# Patient Record
Sex: FEMALE | Race: BLACK OR AFRICAN AMERICAN | Employment: FULL TIME | URBAN - METROPOLITAN AREA
[De-identification: names, ages, dates, MRNs, and addresses within clinical notes are randomized per-mention and may not be internally consistent; named-entity substitution may affect disease eponyms.]

---

## 2018-06-15 ENCOUNTER — HOSPITAL ENCOUNTER (OUTPATIENT)
Dept: PHYSICAL THERAPY | Age: 58
Discharge: HOME | End: 2018-06-15
Payer: COMMERCIAL

## 2018-06-15 PROCEDURE — 97110 THERAPEUTIC EXERCISES: CPT

## 2018-06-15 PROCEDURE — 97162 PT EVAL MOD COMPLEX 30 MIN: CPT

## 2018-06-15 NOTE — PROGRESS NOTES
PHYSICAL THERAPY - DAILY TREATMENT NOTE    Patient Name: Shanelle Bring        Date: 6/15/2018  : 1960   YES Patient  Verified  Visit #:     Insurance: Payor: Fernandez Backer / Plan: [place] [employer] / Product Type: PPO /      In time: 10:10 Out time: 11:07   Total Treatment Time: 53     Medicare Time Tracking (below)   Total Timed Codes (min):  na 1:1 Treatment Time:  na     TREATMENT AREA =  Left knee pain [M25.562]    SUBJECTIVE  Pain Level (on 0 to 10 scale):  3  / 10   Medication Changes/New allergies or changes in medical history, any new surgeries or procedures? NO    If yes, update Summary List   Subjective Functional Status/Changes:  []  No changes reported     See medical history          OBJECTIVE  Modalities Rationale:     decrease edema, decrease pain and increase tissue extensibility to improve patient's ability to stand and walk   min [] Estim, type/location:                                      []  att     []  unatt     []  w/US     []  w/ice    []  w/heat    min []  Mechanical Traction: type/lbs                   []  pro   []  sup   []  int   []  cont    []  before manual    []  after manual    min []  Ultrasound, settings/location:      min []  Iontophoresis w/ dexamethasone, location:                                               []  take home patch       []  in clinic   10 min [x]  Ice     []  Heat    location/position: L knee in supine    min []  Vasopneumatic Device, press/temp:     min []  Other:    [x] Skin assessment post-treatment (if applicable):    [x]  intact    []  redness- no adverse reaction     []redness  adverse reaction:        15 min Therapeutic Exercise:  [x]  See flow sheet   Rationale:      increase ROM and increase strength to improve the patients ability to stand and walk          min Patient Education:  YES  Reviewed HEP   /POC/Goals[x]  Progressed/Changed HEP based on:  Issued HEP. Continue ice 10 minutes at least 1x day.      Other Objective/Functional Measures:    See POC     Post Treatment Pain Level (on 0 to 10) scale:   3  / 10     ASSESSMENT  Assessment/Changes in Function:     Justification for Eval Code Complexity:  Patient History : Chronic pain  Examination see exam   Clinical Presentation: evolving  Clinical Decision Making : FOTO : 30 /100       []  See Progress Note/Recertification   Patient will continue to benefit from skilled PT services to modify and progress therapeutic interventions, address functional mobility deficits, address ROM deficits, address strength deficits, analyze and address soft tissue restrictions, analyze and modify body mechanics/ergonomics and instruct in home and community integration to attain remaining goals. Progress toward goals / Updated goals:    Initial evaluation completed with home exercise program and education initiated.        PLAN  [x]  Upgrade activities as tolerated YES Continue plan of care   []  Discharge due to :    []  Other:      Therapist: Sherial Schirmer, PT    Date: 6/15/2018 Time: 9:27 AM     Future Appointments  Date Time Provider Xiomara Moeller   6/18/2018 11:00 AM Issac Flores [facility]   6/20/2018 10:30 AM Issac Flores PTA [facility]   6/22/2018 2:00 PM Issac Flores PTA [facility]   6/25/2018 12:00 PM [facility] PT [facility]REEZY 1 [facility]   6/27/2018 11:30 AM [facility] PT [facility]REEZY 1 [facility]   6/28/2018 10:30 AM [facility]   6/29/2018 1:00 PM [facility] PT BREEZY 1 [facility]   7/2/2018 11:30 AM Issac Flores PTA [facility]   7/6/2018 11:00 AM Polo Gaston [facility]   7/9/2018 11:30 AM Issac Flores PTA [facility]   7/11/2018 11:30 AM Issac Flores PTA [facility]   7/13/2018 2:30 PM Sherial Schirmer, PT [facility]   7/16/2018 11:30 AM Issac Flores PTA [facility]   7/18/2018 11:30 AM Issac Flores PTA [facility]   7/20/2018 11:30 AM Issac Flores PTA [facility]

## 2018-06-15 NOTE — PROGRESS NOTES
[address]  [facility] PHYSICAL THERAPY AT [facility] [address], [address], [place] [address] - Phone: [phone]  Fax: [phone] / 2469 [facility]  Patient Name: Neelam Johns : 1960   Medical   Diagnosis: Left knee pain [M25.562] Treatment Diagnosis: Left knee pain [M25.562]   Onset Date: 2018 DOS     Referral Source: Zoraida Chang MD Start of [facility]): 6/15/2018   Prior Hospitalization: See medical history Provider #: [id number]   Prior Level of Function: Chronic knee pain since    Comorbidities: HTN   Medications: Verified on Patient Summary List   The Plan of Care and following information is based on the information from the initial evaluation.   ==================================================================================  Assessment / key information:  Patient is a 61 y.o. female who presents to [facility] at [facility] with diagnosis of Left knee pain [M25.562]. Patient is s/p L TKA . Patient reports L knee tightness, stiffness and pain. Patient's pain level ranges from 3/10 to 8/10. Upon objective evaluation patient presents with impaired and painful AROM/AAROM of L knee, impaired strength Lknee and bilateral hip, edema of 6cm at L knee  and decreased flexibility of  quad and hamstring muscles. She is able to generate a minimal L quad set. She requires assist to [facility] for bed mobility. Patient ambulates with rolling walker demonstrating gait deviations of decreased scott and decreased knee flexion during swing phase. AROM/AAROM of L knee as follows:  flexion 40/70 and extension 0. Patient scored 30 on FOTO indicating decreased function and quality of life. Functional limitations include bed mobility, standing, walking, stairs and household chores.   Patient can benefit from skilled PT to increase ROM, flexibility, mobility and strength, decrease pain and edema to improve overall function and quality of life.  ==================================================================================  Eval Complexity: History: MEDIUM  Complexity : 1-2 comorbidities / personal factors will impact the outcome/ POC Exam:HIGH Complexity : 4+ Standardized tests and measures addressing body structure, function, activity limitation and / or participation in recreation  Presentation: MEDIUM Complexity : Evolving with changing characteristics  Clinical Decision Making:MEDIUM Complexity : FOTO score of 26-74Overall Complexity:MEDIUM  Problem List: pain affecting function, decrease ROM, decrease strength, edema affecting function, impaired gait/ balance, decrease ADL/ functional abilitiies, decrease activity tolerance, decrease flexibility/ joint mobility. Treatment Plan may include any combination of the following: Therapeutic exercise, Therapeutic activities, Neuromuscular re-education, Physical agent/modality, Gait/balance training, Manual therapy and Patient education  Patient / Family readiness to learn indicated by: asking questions, trying to perform skills and interest  Persons(s) to be included in education: patient (P)  Barriers to Learning/Limitations: None  Measures taken:    Patient Goal (s): Get leg moving again\"   Patient self reported health status: good  Rehabilitation Potential: good   Short Term Goals: To be accomplished in 3  weeks:  1) Patient performing daily home exercise program.. 2) Increase FOTO to 45 indicating improved function and quality of life. 3) Increase AROM in Lknee 0-90 degrees to facilitate walking. 4) Patient able to perform normal L SLR withquad set to 45 degrees to facilitate independent bed mobility. 5) Patient ambulating household distances with a straight cane.  Long Term Goals: To be accomplished in  6  weeks:  1) Patient  independent with HEP. 2) Patient will improve L knee AROM to 0-120 degrees to facilitate walking.    3) Increase FOTO to 62 indicating improved function and quality of life. 4) Patient will increase L knee strength and bilateral hip abd/flexion to 4/5 so patient able to negotiate 1 flight of stairs with rail and SC step over step. 5) Patient ambulating community distances with a straight cane. Frequency / Duration:   Patient to be seen  3  times per week for 6  weeks:  Patient / Caregiver education and instruction: activity modification, exercises and other ice  G-Codes (GP): kevin    Therapist Signature: Tyler Levy PT Date: 4/78/0640   Certification Period: na Time: 9:30 AM   ==================================================================================  I certify that the above Physical Therapy Services are being furnished while the patient is under my care. I agree with the treatment plan and certify that this therapy is necessary. Physician Signature:        Date:       Time:     Please sign and return to [facility] or you may fax the signed copy to [phone]. Thank you.

## 2018-06-18 ENCOUNTER — HOSPITAL ENCOUNTER (OUTPATIENT)
Dept: PHYSICAL THERAPY | Age: 58
Discharge: HOME | End: 2018-06-18
Payer: COMMERCIAL

## 2018-06-18 ENCOUNTER — APPOINTMENT (OUTPATIENT)
Dept: PHYSICAL THERAPY | Age: 58
End: 2018-06-18
Payer: COMMERCIAL

## 2018-06-18 PROCEDURE — 97140 MANUAL THERAPY 1/> REGIONS: CPT

## 2018-06-18 PROCEDURE — 97110 THERAPEUTIC EXERCISES: CPT

## 2018-06-18 NOTE — PROGRESS NOTES
PHYSICAL THERAPY - DAILY TREATMENT NOTE    Patient Name: Lan Ronquillo        Date: 2018  : 1960   YES Patient  Verified  Visit #:     Insurance: Payor: Jonathan Lu / Plan: [place] [employer] PPO / Product Type: PPO /      In time: 11:05 am Out time: 11:44 am   Total Treatment Time: 39         TREATMENT AREA =  Left knee pain [M25.562]    SUBJECTIVE  Pain Level (on 0 to 10 scale): 3 / 10   Medication Changes/New allergies or changes in medical history, any new surgeries or procedures? NO    If yes, update Summary List   Subjective Functional Status/Changes:  []  No changes reported     \"i woke up feeling sick today. \" doing HEP 2-3x per day. \"It gets so stiff. \" 'I 've been icing on and off through the day. I have a friend that had a knee replacement and I use his using ice machine through night. \" pt reports no checking with MD about ice machine.  friend         OBJECTIVE  Modalities Rationale:     decrease edema, decrease inflammation, decrease pain and increase tissue extensibility to improve patient's ability to perform self care   min [] Estim, type/location:                                      []  att     []  unatt     []  w/US     []  w/ice    []  w/heat    min []  Mechanical Traction: type/lbs                   []  pro   []  sup   []  int   []  cont    []  before manual    []  after manual    min []  Ultrasound, settings/location:      min []  Iontophoresis w/ dexamethasone, location:                                               []  take home patch       []  in clinic   10 min [x]  Ice     []  Heat    location/position: Semi reclined left knee    min []  Vasopneumatic Device, press/temp:     min []  Other:    [x] Skin assessment post-treatment (if applicable):    [x]  intact    []  redness- no adverse reaction     []redness  adverse reaction:        12 min Therapeutic Exercise:  [x]  See flow sheet   Rationale:      increase ROM and increase strength to improve the patients ability to perform self care     9 min Manual Therapy: AAROM for semi reclined hip ABD and SLR. gentlle tapping  Tactile cues for proper quad recruitment, AAROM left knee flexion, gentle PROM Left knee flexion   Rationale:      decrease pain, increase ROM, increase tissue extensibility and decrease edema  to improve patient's ability to improve tissue mobility for self care      8 min Patient Education:  YES  Reviewed HEP   [x]  Progressed/Changed HEP based on:  Good tolerance to exercise. Improving quad set     Other Objective/Functional Measures:    Supine self AAROM left knee flexion: 64 degrees; PROM left knee flexion: 76 degrees     Post Treatment Pain Level (on 0 to 10) scale:   3  / 10     ASSESSMENT  Assessment/Changes in Function:   Progressed exercise per protocol/ POC. Pt requires AAROM for support in supine SLR and to maintain neutral spine posture in semi reclined hip ABD. Pt able to perform SLR with assist and verbal cues. All supine exercise performed this session in semi reclined secondary to pt reporting nausea today. []  See Progress Note/Recertification   Patient will continue to benefit from skilled PT services to modify and progress therapeutic interventions, address functional mobility deficits, address ROM deficits, address strength deficits, analyze and address soft tissue restrictions and instruct in home and community integration to attain remaining goals. Progress toward goals / Updated goals:    First visit from initial HEP.  Progressed treatment per plan of care     PLAN  []  Upgrade activities as tolerated YES Continue plan of care   []  Discharge due to :    []  Other:      Therapist: Perfecto Montiel PTA    Date: 6/18/2018 Time: 11:44 AM     Future Appointments  Date Time Provider Xiomara Moeller   6/20/2018 10:30 AM Perfecto Montiel PTA [facility]   6/22/2018 2:00 PM Perfecto Montiel PTA [facility]   6/25/2018 12:00 PM [facility]   6/27/2018 11:30 AM [facility] PT [facility]   6/28/2018 10:30 AM [facility] AT [facility]   6/29/2018 1:00 PM [facility] 1 [facility]   7/2/2018 11:30 AM Dyan Shows, PTA [facility]   7/6/2018 11:00 AM Leonor Bowser [facility]   7/9/2018 11:30 AM Dyan Shows, PTA [facility]   7/11/2018 11:30 AM Dyan Shows, PTA [facility]   7/13/2018 2:30 PM Casi Santos, PT [facility]   7/16/2018 11:30 AM Dyan Shows, PTA [facility]   7/18/2018 11:30 AM Dyan Shows, PTA [facility]   7/20/2018 11:30 AM Dyan Shows, PTA [facility]

## 2018-06-20 ENCOUNTER — HOSPITAL ENCOUNTER (OUTPATIENT)
Dept: PHYSICAL THERAPY | Age: 58
Discharge: HOME | End: 2018-06-20
Payer: COMMERCIAL

## 2018-06-20 PROCEDURE — 97110 THERAPEUTIC EXERCISES: CPT

## 2018-06-20 PROCEDURE — 97116 GAIT TRAINING THERAPY: CPT

## 2018-06-20 PROCEDURE — 97140 MANUAL THERAPY 1/> REGIONS: CPT

## 2018-06-20 NOTE — PROGRESS NOTES
PHYSICAL THERAPY - DAILY TREATMENT NOTE    Patient Name: Fernandez Roe        Date: 2018  : 1960   YES Patient  Verified  Visit #:   3   of   18  Insurance: Payor: Bakari New / Plan: [place] [employer] PPO / Product Type: PPO /      In time: 10:46 am Out time: 11:26 am   Total Treatment Time: 40         TREATMENT AREA =  Left knee pain [M25.562]    SUBJECTIVE  Pain Level (on 0 to 10 scale): 3  / 10   Medication Changes/New allergies or changes in medical history, any new surgeries or procedures? NO    If yes, update Summary List   Subjective Functional Status/Changes:  []  No changes reported     iTs stiff today. I didn't do much of anything yesterday. I was sick yesterday from the medication.           OBJECTIVE  Modalities Rationale:     decrease edema, decrease inflammation, decrease pain and increase tissue extensibility to improve patient's ability to perform household ambulation   min [] Estim, type/location:                                      []  att     []  unatt     []  w/US     []  w/ice    []  w/heat    min []  Mechanical Traction: type/lbs                   []  pro   []  sup   []  int   []  cont    []  before manual    []  after manual    min []  Ultrasound, settings/location:      min []  Iontophoresis w/ dexamethasone, location:                                               []  take home patch       []  in clinic   10 min [x]  Ice     []  Heat    location/position: Semi reclined LE elevated    min []  Vasopneumatic Device, press/temp:     min []  Other:    [x] Skin assessment post-treatment (if applicable):    [x]  intact    []  redness- no adverse reaction     []redness  adverse reaction:        12 min Therapeutic Exercise:  [x]  See flow sheet   Rationale:      increase ROM and increase strength to improve the patients ability to perform house hold standing and walking     10 min Manual Therapy: Supine AAROM supine knee flexion, PROM left knee flexion , AAROM for supine SLR , SBA for rocking only on recumbent bike   Rationale:      decrease pain, increase ROM and increase tissue extensibility to improve patient's ability to improve tissue mobility in ADLs  8 min gait training in clinic ~ 100 ft x 1 with SBA and SPC. Pt education in eye placement, step length. Equal weight bearing as tolerated in static standing     min Patient Education:  YES  Reviewed HEP   []  Progressed/Changed HEP based on: Other Objective/Functional Measures:  AROM: left knee extension: 0 degrees; AAROM left knee flexion: 58, PROM: 68  Add AAROM on bike rocking only x 3 min, supine to sit for LLE, heel slides and supine SLR,      Post Treatment Pain Level (on 0 to 10) scale:   *3  / 10     ASSESSMENT  Assessment/Changes in Function:   Pt presenting with minimal active quad set recruitment at beginning of treatment. QS improviing to fair-good with small towel under knee and in long sitting for visual and tactile feedback. Min A for LLE for supine to sit on mat improving after exercise and bike to CGA. pt demonstrating improving strength in quad set at end of session. Emphasized HEP 3x per day as tolerated for continued progression toward  ROM goals. pt ambulating into clinic today without AD. Pt reporting she has SPC in car. Recommended SPC for ambulation for community walking for safety in gait. []  See Progress Note/Recertification   Patient will continue to benefit from skilled PT services to modify and progress therapeutic interventions, address functional mobility deficits, address ROM deficits, address strength deficits and instruct in home and community integration to attain remaining goals. Progress toward goals / Updated goals:  1) Patient performing daily home exercise program.goal in progress  2) Increase FOTO to 45 indicating improved function and quality of life.   3) Increase AROM in Lknee 0-90 degrees to facilitate walking.-goal in progress  4) Patient able to perform normal L SLR withquad set to 45 degrees to facilitate independent bed mobility. -goal in progress  5) Patient ambulating household distances with a straight cane-met goal 6-20-18     PLAN  []  Upgrade activities as tolerated YES Continue plan of care   []  Discharge due to :    []  Other:      Therapist: Hitesh Soto PTA    Date: 6/20/2018 Time: 11:26  AM     Future Appointments  Date Time Provider Xiomara Sunni   6/22/2018 2:00 PM Hitesh Soto PTA [place] [address]   6/25/2018 12:00 PM [facility] PT BREEZY 1 [place] [address]   6/27/2018 11:30 AM [facility] PT BREEZY 1 [place] [address]   6/28/2018 10:30 AM Karilyn Soulier [facility] [address]   6/29/2018 1:00 PM [facility] [address] PT BREEZY 1 [place] [address]   7/2/2018 11:30 AM Hitesh Soto PTA [place] [address]   7/6/2018 11:00 AM Juwan Lares [place] [address]   7/9/2018 11:30 AM Hitesh Soto PTA [place] [address]   7/11/2018 11:30 AM Hitesh Soto PTA [place] [address]   7/13/2018 2:30 PM Pauline Johnson PT [place] [address]   7/16/2018 11:30 AM Hitesh Soto PTA [place] [address]   7/18/2018 11:30 AM Hitesh Soto PTA [place] [address]   7/20/2018 11:30 AM Hitesh Soto PTA [place] [address]

## 2018-06-22 ENCOUNTER — HOSPITAL ENCOUNTER (OUTPATIENT)
Dept: PHYSICAL THERAPY | Age: 58
Discharge: HOME | End: 2018-06-22
Payer: COMMERCIAL

## 2018-06-22 PROCEDURE — 97140 MANUAL THERAPY 1/> REGIONS: CPT

## 2018-06-22 PROCEDURE — 97110 THERAPEUTIC EXERCISES: CPT

## 2018-06-22 NOTE — PROGRESS NOTES
PHYSICAL THERAPY - DAILY TREATMENT NOTE    Patient Name: Neelam Johns        Date: 2018  : 1960   YES Patient  Verified  Visit #:     Insurance: Payor: Karolina Martinez / Plan: [place] [employer] PPO / Product Type: PPO /      In time: 2:04 pm Out time: 2:40 pm   Total Treatment Time: 36         TREATMENT AREA =  Left knee pain [M25.562]    SUBJECTIVE  Pain Level (on 0 to 10 scale):  3  / 10   Medication Changes/New allergies or changes in medical history, any new surgeries or procedures? NO    If yes, update Summary List   Subjective Functional Status/Changes:  []  No changes reported     I was OK until last night. Its bothering me mostly where they had the robot go in. ( incision in anterior mid tibia region.)  Pt reports she has been out of her pain medicine x 3 days . Planning to  refill today. Pt reports using SPC in community and going some with AD in home.         OBJECTIVE  Modalities Rationale:     decrease edema, decrease inflammation, decrease pain and increase tissue extensibility to improve patient's ability to perform standing and walking   min [] Estim, type/location:                                      []  att     []  unatt     []  w/US     []  w/ice    []  w/heat    min []  Mechanical Traction: type/lbs                   []  pro   []  sup   []  int   []  cont    []  before manual    []  after manual    min []  Ultrasound, settings/location:      min []  Iontophoresis w/ dexamethasone, location:                                               []  take home patch       []  in clinic   10 min [x]  Ice     []  Heat    location/position: Semi reclined L LE elevated    min []  Vasopneumatic Device, press/temp:     min []  Other:    [x] Skin assessment post-treatment (if applicable):    [x]  intact    []  redness- no adverse reaction     []redness  adverse reaction:        17 min Therapeutic Exercise:  [x]  See flow sheet   Rationale:      increase ROM and increase strength to improve the patients ability to perform functional ADLS     9 min Manual Therapy: Semi reclined gentle PROM into flexion, retrograde massage to L lower compartment to reduce edema   Rationale:      decrease pain, increase ROM and increase tissue extensibility to improve patient's ability to improve tissue mobility in ADLs     min Patient Education:  YES  Reviewed HEP   []  Progressed/Changed HEP based on: Other Objective/Functional Measures:    Self AAROM flexion: 68 degrees, PROM: 75 degrees  Hold rocking on recumbent bike this session . Resume as tolerated. Post Treatment Pain Level (on 0 to 10) scale:   3  / 10     ASSESSMENT  Assessment/Changes in Function:   Pt demonstrating improving quad control and recruitment in supine to sit. Pt decreasing assist from min to CGA after exercise. Review HEP, instruction in self massage techniques. Incision intact without drainage and dressing. MD to remove stitches next week per pt      []  See Progress Note/Recertification   Patient will continue to benefit from skilled PT services to modify and progress therapeutic interventions, address functional mobility deficits, address ROM deficits, address strength deficits and instruct in home and community integration to attain remaining goals. Progress toward goals / Updated goals:  1) Patient performing daily home exercise program.goal in progress  2) Increase FOTO to 45 indicating improved function and quality of life. 3) Increase AROM in Lknee 0-90 degrees to facilitate walking. 4) Patient able to perform normal L SLR withquad set to 45 degrees to facilitate independent bed mobility. 5) Patient ambulating household distances with a straight cane.      PLAN  []  Upgrade activities as tolerated YES Continue plan of care   []  Discharge due to :    []  Other:      Therapist: Chao Johnston PTA    Date: 6/22/2018 Time: 2:40 PM     Future Appointments  Date Time Provider Xiomara Moeller   6/25/2018 12:00 PM [facility] PT [facility] [place] [facility]   6/27/2018 11:30 AM [facility] PT BREEZY 1 [facility]   6/28/2018 10:30 AM [facility]   6/29/2018 1:00 PM [facility] PT BREEZY 1 [facility]   7/2/2018 11:30 AM Lula Ash, PTA [facility]   7/6/2018 11:00 AM Duncan Schaeffer [facility]   7/9/2018 11:30 AM Lula Brine, PTA [facility]   7/11/2018 11:30 AM Lula Brine, PTA [facility]   7/13/2018 2:30 PM Becky Ding, PT [facility]   7/16/2018 11:30 AM Lula Brine, PTA [facility]   7/18/2018 11:30 AM Lula Brine, PTA [facility]   7/20/2018 11:30 AM Lula Brine, PTA [facility]

## 2018-06-25 ENCOUNTER — HOSPITAL ENCOUNTER (OUTPATIENT)
Dept: PHYSICAL THERAPY | Age: 58
Discharge: HOME | End: 2018-06-25
Payer: COMMERCIAL

## 2018-06-25 PROCEDURE — 97140 MANUAL THERAPY 1/> REGIONS: CPT | Performed by: PHYSICAL THERAPIST

## 2018-06-25 PROCEDURE — 97110 THERAPEUTIC EXERCISES: CPT | Performed by: PHYSICAL THERAPIST

## 2018-06-25 NOTE — PROGRESS NOTES
PT DAILY TREATMENT NOTE     Patient Name: Krysten Garcia  Date:2018  : 1960  [x]  Patient  Verified  Payor: Augustin Strauss / Plan: [place] [employer] [place] / Product Type: PPO /    In time:12:00  Out time:1:00  Total Treatment Time (min): 60  Visit #: 5 of 18    Treatment Area: Left knee pain [M25.562]    SUBJECTIVE  Pain Level (0-10 scale): 2  Any medication changes, allergies to medications, adverse drug reactions, diagnosis change, or new procedure performed?: [x] No    [] Yes (see summary sheet for update)  Subjective functional status/changes:   [] No changes reported  Able to get lift leg into bed by herself this morning.       OBJECTIVE    Modality rationale: decrease edema, decrease inflammation and decrease pain to improve the patients ability to walk without pain   Min Type Additional Details    [] Estim:  []Unatt       []IFC  []Premod                        []Other:  [x]w/ice   []w/heat  Position:  Location:    [] Estim: []Att    []TENS instruct  []NMES                    []Other:  []w/US   []w/ice   []w/heat  Position:  Location:    []  Traction: [] Cervical       []Lumbar                       [] Prone          []Supine                       []Intermittent   []Continuous Lbs:  [] before manual  [] after manual    []  Ultrasound: []Continuous   [] Pulsed                           []1MHz   []3MHz W/cm2:  Location:    []  Iontophoresis with dexamethasone         Location: [] Take home patch   [] In clinic   10 [x]  Ice     []  heat  []  Ice massage  []  Laser   []  Anodyne Position:  Supine with HOB elevalted  Location: left knee    []  Laser with stim  []  Other:  Position:  Location:    []  Vasopneumatic Device Pressure:       [] lo [] med [] hi   Temperature: [] lo [] med [] hi   [] Skin assessment post-treatment:  [x]intact []redness- no adverse reaction      38 min Therapeutic Exercise:  [] See flow sheet :   Rationale: increase ROM and increase strength to improve the patients ability to walk without  AD    12 min Manual Therapy:  Gentle patellar mobs and gentle PROM   Rationale: decrease pain, increase ROM, increase tissue extensibility and decrease edema  to walk without             With   [] TE   [] TA   [] neuro   [] other: Patient Education: [x] Review HEP    [] Progressed/Changed HEP based on:   [] positioning   [] body mechanics   [] transfers   [] heat/ice application    [] other:      Other Objective/Functional Measures:     Noted some swelling in left leg - advised elevating foot of mattress to aid in reducing edema  Added bal and bands    AAROM in sittin-75    Pain Level (0-10 scale) post treatment: 1     ASSESSMENT/Changes in Function:  Able to (I) lift leg into bed today. Beginning to walk around the house without cane. Patient will continue to benefit from skilled PT services to modify and progress therapeutic interventions, address functional mobility deficits, address ROM deficits, address strength deficits, analyze and address soft tissue restrictions, analyze and cue movement patterns, assess and modify postural abnormalities and instruct in home and community integration to attain remaining goals. []  See Plan of Care  []  See progress note/recertification  []  See Discharge Summary           Progress toward goals / Updated goals:  1) Patient performing daily home exercise program.Met  2) Increase FOTO to 45 indicating improved function and quality of life. Progressing - reassess at PN  3) Increase AROM in Lknee 0-90 degrees to facilitate walking. Left knee Progressing - AROM:   4) Patient able to perform normal L SLR withquad set to 45 degrees to facilitate independent bed mobility. Met  5) Patient ambulating household distances with a straight cane. Met    PLAN  [x]  Upgrade activities as tolerated     [x]  Continue plan of care  []  Update interventions per flow sheet       []  Discharge due to:_  []  Other:_  Try full revolution on bike nv if able.   Send PN to MD for visit tomorrow.     Estefani Springer, PT 6/25/2018  12:01 PM    Future Appointments  Date Time Provider Xiomara Moeller   6/27/2018 11:30 AM [address] PT BREEZY 1 DMTA [address]   6/29/2018 1:00 PM [address] [facility] [address]   7/2/2018 11:30 AM Christiano Pittman, PTA [place] [id number]   7/6/2018 11:00 AM Christiano Pittman, PTA [place] [address]   7/9/2018 11:30 AM Christiano Pittman, PTA [place] [address]   7/11/2018 11:30 AM [facility]   7/13/2018 2:30 PM Jeannie Philippe, PT [place] [address]   7/16/2018 11:30 AM Christiano Pittman, PTA [place] [address]   7/18/2018 11:30 AM Christiano Pittman, PTA [place] [address]   7/20/2018 11:30 AM Christiano Pittman, PTA [place] [address]

## 2018-06-27 ENCOUNTER — HOSPITAL ENCOUNTER (OUTPATIENT)
Dept: PHYSICAL THERAPY | Age: 58
Discharge: HOME | End: 2018-06-27
Payer: COMMERCIAL

## 2018-06-27 PROCEDURE — 97110 THERAPEUTIC EXERCISES: CPT

## 2018-06-27 PROCEDURE — 97140 MANUAL THERAPY 1/> REGIONS: CPT

## 2018-06-27 PROCEDURE — 97016 VASOPNEUMATIC DEVICE THERAPY: CPT

## 2018-06-27 NOTE — PROGRESS NOTES
PHYSICAL THERAPY - DAILY TREATMENT NOTE    Patient Name: Erika Butler        Date: 2018  : 1960   YES Patient  Verified  Visit #:     Insurance: Payor: Bill Single / Plan: [place] [employer] [place] / Product Type: PPO /      In time: 4:54 Out time: 5:53   Total Treatment Time: 59     Medicare Time Tracking (below)   Total Timed Codes (min):  n/a 1:1 Treatment Time:  n/a     TREATMENT AREA =  Left knee pain [M25.562]    SUBJECTIVE  Pain Level (on 0 to 10 scale):  3  / 10   Medication Changes/New allergies or changes in medical history, any new surgeries or procedures? NO    If yes, update Summary List   Subjective Functional Status/Changes:  []  No changes reported     Pt states \"I had a hard time sleeping last night\"          OBJECTIVE  Modalities Rationale:     decrease inflammation and decrease pain to improve patient's ability to perform ADLs.      min [] Estim, type/location:                                      []  att     []  unatt     []  w/US     []  w/ice    []  w/heat    min []  Mechanical Traction: type/lbs                   []  pro   []  sup   []  int   []  cont    []  before manual    []  after manual    min []  Ultrasound, settings/location:      min []  Iontophoresis w/ dexamethasone, location:                                               []  take home patch       []  in clinic    min []  Ice     []  Heat    location/position:    10 min [x]  Vasopneumatic Device, press/temp: L knee, low pp, 34 deg cleared contraindications    min []  Other:    [x] Skin assessment post-treatment (if applicable):    [x]  intact    []  redness- no adverse reaction     []redness  adverse reaction:        40 min Therapeutic Exercise:  [x]  See flow sheet   Rationale:      increase ROM and increase strength to improve the patients ability to tolerate prolonged standing and walking    9 min Manual Therapy: Gentle gr 1/2 superior and inferior patellar mobs and gentle L knee PROM in flexion and extension Rationale:      decrease pain, increase ROM, increase tissue extensibility and decrease trigger points to improve patient's ability to ambulate with normalized gait. min Patient Education:  YES  Reviewed HEP   []  Progressed/Changed HEP based on: Other Objective/Functional Measures: Added SAQ, TKE, and step up   L knee AROM -9 to 63 deg     Post Treatment Pain Level (on 0 to 10) scale:   0  / 10     ASSESSMENT  Assessment/Changes in Function:     Demonstates decreased L Knee flexion AROM. Progress AAROM with good patient tolerance. Decreased tolerance for L knee flexion PROM. Demonstrated good control with stepping up onto 6\" step, VCing for increased L knee bend in order to ensure proper form. []  See Progress Note/Recertification   Patient will continue to benefit from skilled PT services to modify and progress therapeutic interventions, address functional mobility deficits, address ROM deficits, address strength deficits, analyze and address soft tissue restrictions, analyze and cue movement patterns, analyze and modify body mechanics/ergonomics, assess and modify postural abnormalities, address imbalance/dizziness and instruct in home and community integration to attain remaining goals. Progress toward goals / Updated goals:    1) Patient performing daily home exercise program. Goal in progress   2) Increase FOTO to 45 indicating improved function and quality of life. Goal not assessed  3) Increase AROM in Lknee 0-90 degrees to facilitate walking. Goal in progress - L knee AROM = -9 to 63  4) Patient able to perform normal L SLR with quad set to 45 degrees to facilitate independent bed mobility. 5) Patient ambulating household distances with a straight cane.  Goal Met ambulation community distances with [address], and household distances independently      PLAN  [x]  Upgrade activities as tolerated YES Continue plan of care   []  Discharge due to :    []  Other:      Therapist: Richar Patel Lea Delarosa    Date: 6/27/2018 Time: 11:23 AM     Future Appointments  Date Time Provider Xiomara Sunni   6/27/2018 5:00 PM Bobby Mcdaniels [facility]   6/29/2018 1:00 PM [facility] PT [facility]REE[facility] 1 JASBIRCPTA [facility]   7/2/2018 11:30 AM Delma Shruti, PTA [facility]   7/6/2018 11:00 AM Delma Cooke, PTA [facility]   7/9/2018 11:30 AM Delma Cooke, PTA [facility]   7/11/2018 11:30 AM Delma Shruti, PTA [facility]   7/13/2018 2:30 PM Natalie Etienne, PT [facility]   7/16/2018 11:30 AM Delma Cooke, PTA [facility]   7/18/2018 11:30 AM Delma Cooke, PTA [facility]   7/20/2018 11:30 AM Delma Shruti, PTA [facility]

## 2018-06-28 ENCOUNTER — APPOINTMENT (OUTPATIENT)
Dept: PHYSICAL THERAPY | Age: 58
End: 2018-06-28
Payer: COMMERCIAL

## 2018-06-29 ENCOUNTER — HOSPITAL ENCOUNTER (OUTPATIENT)
Dept: PHYSICAL THERAPY | Age: 58
Discharge: HOME | End: 2018-06-29
Payer: COMMERCIAL

## 2018-06-29 PROCEDURE — 97140 MANUAL THERAPY 1/> REGIONS: CPT | Performed by: PHYSICAL THERAPIST

## 2018-06-29 PROCEDURE — 97112 NEUROMUSCULAR REEDUCATION: CPT | Performed by: PHYSICAL THERAPIST

## 2018-06-29 PROCEDURE — 97110 THERAPEUTIC EXERCISES: CPT | Performed by: PHYSICAL THERAPIST

## 2018-06-29 NOTE — PROGRESS NOTES
PT DAILY TREATMENT NOTE     Patient Name: Alfred Iqbal  Date:2018  : 1960  [x]  Patient  Verified  Payor: Lisa Huertas / Plan: [place] [employer] / Product Type: PPO /    In time:12:56  Out time:2:00  Total Treatment Time (min): 60  Visit #: 7 of 18    Treatment Area: Left knee pain [M25.562]    SUBJECTIVE  Pain Level (0-10 scale): 2  Any medication changes, allergies to medications, adverse drug reactions, diagnosis change, or new procedure performed?: [x] No    [] Yes (see summary sheet for update)  Subjective functional status/changes:   [] No changes reported  Doing well with walking at home with no cane.       OBJECTIVE    Modality rationale: decrease edema, decrease inflammation and decrease pain to improve the patients ability to walk with no pain   Min Type Additional Details    [] Estim:  []Unatt       []IFC  []Premod                        []Other:  []w/ice   []w/heat  Position:  Location:    [] Estim: []Att    []TENS instruct  []NMES                    []Other:  []w/US   []w/ice   []w/heat  Position:  Location:    []  Traction: [] Cervical       []Lumbar                       [] Prone          []Supine                       []Intermittent   []Continuous Lbs:  [] before manual  [] after manual    []  Ultrasound: []Continuous   [] Pulsed                           []1MHz   []3MHz W/cm2:  Location:    []  Iontophoresis with dexamethasone         Location: [] Take home patch   [] In clinic    []  Ice     []  heat  []  Ice massage  []  Laser   []  Anodyne Position:  Location:    []  Laser with stim  []  Other:  Position:  Location:   10 [x]  Vasopneumatic Device Pressure:       [] lo [x] med [] hi   Temperature: [] lo [x] med [] hi   [] Skin assessment post-treatment:  [x]intact []redness- no adverse reaction    []redness  adverse reaction:       30 min Therapeutic Exercise:  [] See flow sheet :   Rationale: increase ROM and increase strength to improve the patients ability to walk without AD      12 min Neuromuscular Re-education:  []  See flow sheet :   Rationale: increase strength, improve coordination, improve balance and increase proprioception  to improve  the patients ability to walk without AD    8 min Manual Therapy:  Gentle AP mobs with IR in sitting with patient working on knee flexion   Rationale: decrease pain, increase ROM and increase tissue extensibility to bend knee for stairs and walking          With   [] TE   [] TA   [] neuro   [] other: Patient Education: [x] Review HEP    [] Progressed/Changed HEP based on:   [] positioning   [] body mechanics   [] transfers   [] heat/ice application    [] other:      Other Objective/Functional Measures:     3 to 88 - AAROM in sitting    Worked on WS and \"hula hoops\" in standing with mirror to increase WB on left leg  Instructed in proper gait with SPC    Pain Level (0-10 scale) post treatment: 1    ASSESSMENT/Changes in Function:  Improved gait with increased WS and WB after session today; AAROM 3-88 degrees. Patient will continue to benefit from skilled PT services to modify and progress therapeutic interventions, address functional mobility deficits, address ROM deficits, address strength deficits, analyze and address soft tissue restrictions, analyze and cue movement patterns, assess and modify postural abnormalities and instruct in home and community integration to attain remaining goals. [x]  See Plan of Care  []  See progress note/recertification  []  See Discharge Summary         Progress toward goals / Updated goals:     1) Patient performing daily home exercise program. Goal in progress   2) Increase FOTO to 45 indicating improved function and quality of life. Goal not assessed  3) Increase AROM in Lknee 0-90 degrees to facilitate walking. Goal in progress - L knee AROM = -3 to 88  4) Patient able to perform normal L SLR with quad set to 45 degrees to facilitate independent bed mobility.  Goal in progress - able to (I) lift left leg with SLR 3x today (6/29/18)  5) Patient ambulating household distances with a straight cane. Goal Met ambulation community distances with [facility], and household distances independently      PLAN  [x]  Upgrade activities as tolerated     [x]  Continue plan of care  []  Update interventions per flow sheet       []  Discharge due to:_  []  Other:_  Progress with WB exercises    Carie Bah, PT 6/29/2018  1:26 PM    Future Appointments  Date Time Provider Xiomara Moeller   7/2/2018 11:30 AM [facility], [facility]   7/6/2018 11:00 AM [facility], [facility]   7/9/2018 11:30 AM [facility], [facility]   7/11/2018 11:30 AM [facility], [facility]   7/13/2018 2:30 PM Grady Martínez, PT [facility]   7/16/2018 11:30 AM [facility], [facility]   7/18/2018 11:30 AM [facility], [facility]   7/20/2018 11:30 AM [facility], [facility]

## 2018-07-02 ENCOUNTER — HOSPITAL ENCOUNTER (OUTPATIENT)
Dept: PHYSICAL THERAPY | Age: 58
Discharge: HOME | End: 2018-07-02
Payer: COMMERCIAL

## 2018-07-02 PROCEDURE — 97140 MANUAL THERAPY 1/> REGIONS: CPT

## 2018-07-02 PROCEDURE — 97110 THERAPEUTIC EXERCISES: CPT

## 2018-07-02 PROCEDURE — 97016 VASOPNEUMATIC DEVICE THERAPY: CPT

## 2018-07-02 PROCEDURE — 97116 GAIT TRAINING THERAPY: CPT

## 2018-07-02 NOTE — PROGRESS NOTES
PHYSICAL THERAPY - DAILY TREATMENT NOTE    Patient Name: Gregg Quinones        Date: 2018  : 1960   YES Patient  Verified  Visit #:     Insurance: Payor: Kate Waldrop / Plan: [place] [employer] [place] / Product Type: PPO /      In time: 11:27 am Out time: 12:15 pm   Total Treatment Time: 48     TREATMENT AREA =  Left knee pain [M25.562]    SUBJECTIVE  Pain Level (on 0 to 10 scale): 2  / 10   Medication Changes/New allergies or changes in medical history, any new surgeries or procedures? NO    If yes, update Summary List   Subjective Functional Status/Changes:  []  No changes reported     Yesterday felt pretty good but today she woke up feeling pretty stiff. Pt reports doing 1 set of HEP prior to PT apt today.            OBJECTIVE  Modalities Rationale:     decrease edema, decrease inflammation, decrease pain and increase tissue extensibility to improve patient's ability to perform prolonged walking and standing   min [] Estim, type/location:                                      []  att     []  unatt     []  w/US     []  w/ice    []  w/heat    min []  Mechanical Traction: type/lbs                   []  pro   []  sup   []  int   []  cont    []  before manual    []  after manual    min []  Ultrasound, settings/location:      min []  Iontophoresis w/ dexamethasone, location:                                               []  take home patch       []  in clinic    min []  Ice     []  Heat    location/position:    10 min [x]  Vasopneumatic Device, press/temp: Low 34 degrees    min []  Other:    [x] Skin assessment post-treatment (if applicable):    [x]  intact    []  redness- no adverse reaction     []redness  adverse reaction:        22 min Therapeutic Exercise:  [x]  See flow sheet   Rationale:      increase ROM and increase strength to improve the patients ability toperform prolonged walking and standing      8 min Manual Therapy: Supine patella glides gr 1-2, PROM left knee flexion and extension, retrograde massage to left lower compartment   Rationale:      decrease pain, increase ROM, increase tissue extensibility and decrease edema  to improve patient's ability to perform prolonged walking and standing  8 min Gait training in clinic even surfaces ~ 300 ft x 1 with SPC and WBAT on LLE. VCs for proper heel strike, step length     min Patient Education:  YES  Reviewed HEP   []  Progressed/Changed HEP based on: Other Objective/Functional Measures:  AROM: -4 to 85 degrees   PROM: 0 to 90 degrees     Post Treatment Pain Level (on 0 to 10) scale:  0  / 10     ASSESSMENT  Assessment/Changes in Function:   Quad sets, heel slides , and SLR, hip ABD performed in supine vs semi reclined. Pt was able to perform supine SLR with fair/good quad control with Verbal and tactile cues for proper  form. [x]  See Progress Note/Recertification   Patient will continue to benefit from skilled PT services to modify and progress therapeutic interventions, address functional mobility deficits, address ROM deficits, address strength deficits, analyze and address soft tissue restrictions and instruct in home and community integration to attain remaining goals. Progress toward goals / Updated goals:  1) Patient performing daily home exercise program. Goal in progress   2) Increase FOTO to 45 indicating improved function and quality of life.  Goal not assessed  3) Increase AROM in Lknee 0-90 degrees to facilitate walking.  Goal in progress - L knee AROM = -3 to 85  4) Patient able to perform normal L SLR with quad set to 45 degrees to facilitate independent bed mobility.  Goal in progress - able to (I) lift left leg with SLR 10x   5) Patient ambulating household distances with a straight cane. Goal Met ambulation community distances with [address], and household distances independently       PLAN  []  Upgrade activities as tolerated YES Continue plan of care   []  Discharge due to :    []  Other:      Therapist: Virgie Singh, PTA    Date: 7/2/2018 Time: 12:15 pm     Future Appointments  Date Time Provider Xiomara Moeller   7/6/2018 11:00 AM Virgie Singh PTA [facility]   7/9/2018 11:30 AM Virgie Singh PTA [facility]   7/11/2018 11:30 AM Virgie Singh PTA [facility]   7/13/2018 2:30 PM Maty Moralez PT [facility]   7/16/2018 11:30 AM Virgie Singh PTA [facility]   7/18/2018 11:30 AM Virgie Singh PTA [facility]   7/20/2018 11:30 AM Virgie Singh PTA [facility]

## 2018-07-06 ENCOUNTER — HOSPITAL ENCOUNTER (OUTPATIENT)
Dept: PHYSICAL THERAPY | Age: 58
Discharge: HOME | End: 2018-07-06
Payer: COMMERCIAL

## 2018-07-06 PROCEDURE — 97016 VASOPNEUMATIC DEVICE THERAPY: CPT

## 2018-07-06 PROCEDURE — 97110 THERAPEUTIC EXERCISES: CPT

## 2018-07-06 PROCEDURE — 97140 MANUAL THERAPY 1/> REGIONS: CPT

## 2018-07-06 NOTE — PROGRESS NOTES
PHYSICAL THERAPY - DAILY TREATMENT NOTE    Patient Name: Lizzette Carranza        Date: 2018  : 1960   YES Patient  Verified  Visit #:     Insurance: Payor: Kenyatta Root / Plan: Jerome Maier PPO / Product Type: PPO /      In time: 11:05 am Out time: 12:02 pm   Total Treatment Time: 57     TREATMENT AREA =  Left knee pain [M25.562]    SUBJECTIVE  Pain Level (on 0 to 10 scale): 0  / 10   Medication Changes/New allergies or changes in medical history, any new surgeries or procedures? NO    If yes, update Summary List   Subjective Functional Status/Changes:  []  No changes reported     Its stiff today. Doing HEP usually 2x per day. Pt reports she was able to put foot up at side of tub this morning to dry feet for the first time.          OBJECTIVE  Modalities Rationale:     decrease edema, decrease inflammation, decrease pain and increase tissue extensibility to improve patient's ability to perform functional ADLs   min [] Estim, type/location:                                      []  att     []  unatt     []  w/US     []  w/ice    []  w/heat    min []  Mechanical Traction: type/lbs                   []  pro   []  sup   []  int   []  cont    []  before manual    []  after manual    min []  Ultrasound, settings/location:      min []  Iontophoresis w/ dexamethasone, location:                                               []  take home patch       []  in clinic    min []  Ice     []  Heat    location/position:    10 min [x]  Vasopneumatic Device, press/temp: Low 34 degrees    min []  Other:    [x] Skin assessment post-treatment (if applicable):    [x]  intact    []  redness- no adverse reaction     []redness  adverse reaction:        39 min Therapeutic Exercise:  [x]  See flow sheet   Rationale:      increase ROM and increase strength to improve the patients ability to perform prolonged walking and standing     8 min Manual Therapy: Supine patella glides; PROM supine knee flexion; scar massage around incision   Rationale:      decrease pain, increase ROM, increase tissue extensibility and decrease edema  to improve patient's ability to improve tissue mobilty     min Patient Education:  YES  Reviewed HEP   []  Progressed/Changed HEP based on: Other Objective/Functional Measures:  PROM: 95 degrees  AROM: 90 degrees     Post Treatment Pain Level (on 0 to 10) scale:  0 / 10     ASSESSMENT  Assessment/Changes in Function:   Emphasized self stretching and review HEP to be performed 2-3x per day as tolerated. Pt challenged with 6 inch step up and quad control in stepping up. Changed to 4 inch step with improved quad control. Pt now able to perform supine SLR independently 1set 10x ; 1 set 5 x; pt instructed in sitting SLR at side of bed 3 reps. Pt able to perform supine hip ABD I x 15 reps. []  See Progress Note/Recertification   Patient will continue to benefit from skilled PT services to modify and progress therapeutic interventions, address functional mobility deficits, address ROM deficits, address strength deficits and instruct in home and community integration to attain remaining goals. Progress toward goals / Updated goals:  1) Patient performing daily home exercise program. Goal in progress  2) Increase FOTO to 45 indicating improved function and quality of life. 3) Increase AROM in Lknee 0-90 degrees to facilitate walking.-Goal in progress  4) Patient able to perform normal L SLR withquad set to 45 degrees to facilitate independent bed mobility. -goal in prgress  5) Patient ambulating household distances with a straight cane.  Met goal 7-6-18     PLAN  []  Upgrade activities as tolerated YES Continue plan of care   []  Discharge due to :    []  Other:      Therapist: Charly Hughes PTA    Date: 7/6/2018 Time: 12:02 pm     Future Appointments  Date Time Provider Xiomara Moeller   7/9/2018 11:30 AM Charly Hughes PTA [facility]   7/11/2018 11:30 AM Charly Hughes PTA [facility]   7/13/2018 2:30 PM Hollywoodc[place]ina Gerber, PT [facility]   7/16/2018 11:30 AM Dagmar Bowen PTA [facility]   7/18/2018 11:30 AM Dagmar Bowen PTA [facility]   7/20/2018 11:30 AM Dagmar Bowen PTA [facility]

## 2018-07-09 ENCOUNTER — HOSPITAL ENCOUNTER (OUTPATIENT)
Dept: PHYSICAL THERAPY | Age: 58
Discharge: HOME | End: 2018-07-09
Payer: COMMERCIAL

## 2018-07-09 PROCEDURE — 97140 MANUAL THERAPY 1/> REGIONS: CPT

## 2018-07-09 PROCEDURE — 97016 VASOPNEUMATIC DEVICE THERAPY: CPT

## 2018-07-09 PROCEDURE — 97110 THERAPEUTIC EXERCISES: CPT

## 2018-07-09 PROCEDURE — 97116 GAIT TRAINING THERAPY: CPT

## 2018-07-09 NOTE — PROGRESS NOTES
PHYSICAL THERAPY - DAILY TREATMENT NOTE    Patient Name: Ed Velasco        Date: 2018  : 1960   YES Patient  Verified  Visit #:   10  of   18  Insurance: Payor: Buddy Valles / Plan: Ceci Medina PPO / Product Type: PPO /      In time: 11:37 am Out time: 12:39 pm   Total Treatment Time: 62     TREATMENT AREA =  Left knee pain [M25.562]    SUBJECTIVE  Pain Level (on 0 to 10 scale):  2  / 10   Medication Changes/New allergies or changes in medical history, any new surgeries or procedures? NO    If yes, update Summary List   Subjective Functional Status/Changes:  []  No changes reported   \"I just woke up with it really stiff today. \"  \"I'm up and moving . I'm standing longer now. Night time is the worst. Last night I couldn't sleep to save my life. That achy pain just wouldn't go away like it usually does. Its better than it was before.      OBJECTIVE  Modalities Rationale:     decrease edema, decrease inflammation, decrease pain and increase tissue extensibility to improve patient's ability to perform functional ADLs   min [] Estim, type/location:                                      []  att     []  unatt     []  w/US     []  w/ice    []  w/heat    min []  Mechanical Traction: type/lbs                   []  pro   []  sup   []  int   []  cont    []  before manual    []  after manual    min []  Ultrasound, settings/location:      min []  Iontophoresis w/ dexamethasone, location:                                               []  take home patch       []  in clinic    min []  Ice     []  Heat    location/position:    10 min [x]  Vasopneumatic Device, press/temp: Low 34 degrees    min []  Other:    [x] Skin assessment post-treatment (if applicable):    [x]  intact    []  redness- no adverse reaction     []redness  adverse reaction:        33 min Therapeutic Exercise:  [x]  See flow sheet   Rationale:      increase ROM, increase strength and improve coordination to improve the patients ability to perform functional ADLs    9 min Manual Therapy: Supine patella glides ; PROM supine knee flexion, retrograde massage to left lower compartement   Rationale:      decrease pain, increase ROM, increase tissue extensibility, decrease edema  and correct positional vertigo to improve patient's ability to improve tissue mobility in functional ADLs   10  Min Gait training: gait training in clinic without AD with mirror for visual feedback and VCs for heel to toe , equal stride length, reciprocal UE arm swing, minimize trendelenburg gait; gait training on stairs for reciprocal gait ascending stairs and single step gait descending stairs   min Patient Education:  YES  Reviewed HEP   []  Progressed/Changed HEP based on: Other Objective/Functional Measures:    AROM: 92 degrees   PROM: 100 degrees   Post Treatment Pain Level (on 0 to 10) scale:   0  / 10     ASSESSMENT  Assessment/Changes in Function:   Pt challenged in side lying hip ABD. MMT left hip ABD: 3-/5. Pt demonstrating improving quad control in supine and side lying SLR. Pt demonstrating improving gait pattern after visual and verbal feed back. Updated written HEP. []  See Progress Note/Recertification   Patient will continue to benefit from skilled PT services to modify and progress therapeutic interventions, address functional mobility deficits, address ROM deficits, address strength deficits and instruct in home and community integration to attain remaining goals.    Progress toward goals / Updated goals:    Reassess for progress note next visit     PLAN  []  Upgrade activities as tolerated YES Continue plan of care   []  Discharge due to :    []  Other:      Therapist: Rachel Asencio PTA    Date: 7/9/2018 Time: 12:39  pm     Future Appointments  Date Time Provider Xiomara Moeller   7/11/2018 11:30 AM Rachel Asencio PTA [facility]   7/13/2018 2:30 PM Ivan Boo, PT [facility]   7/16/2018 11:30 AM Rachel Asencio PTA [facility]   7/18/2018 11:30 AM Danielle Vidales PTA [facility]   7/20/2018 11:30 AM Danielle Vidales PTA [facility]

## 2018-07-11 ENCOUNTER — HOSPITAL ENCOUNTER (OUTPATIENT)
Dept: PHYSICAL THERAPY | Age: 58
Discharge: HOME | End: 2018-07-11
Payer: COMMERCIAL

## 2018-07-11 PROCEDURE — 97110 THERAPEUTIC EXERCISES: CPT

## 2018-07-11 PROCEDURE — 97116 GAIT TRAINING THERAPY: CPT

## 2018-07-11 PROCEDURE — 97140 MANUAL THERAPY 1/> REGIONS: CPT

## 2018-07-11 PROCEDURE — 97016 VASOPNEUMATIC DEVICE THERAPY: CPT

## 2018-07-11 NOTE — PROGRESS NOTES
PHYSICAL THERAPY - DAILY TREATMENT NOTE    Patient Name: Cheri Manzanares        Date: 2018  : 1960   YES Patient  Verified  Visit #:     Insurance: Payor: Gabo Sanchez / Plan: Peyton Urbina PPO / Product Type: PPO /      In time: 11:30 am Out time: 12:43 pm   Total Treatment Time: 73     Medicare Time Tracking (below)   Total Timed Codes (min):  n/a 1:1 Treatment Time:  n/a     TREATMENT AREA =  Left knee pain [M25.562]    SUBJECTIVE  Pain Level (on 0 to 10 scale): 0-1  / 10   Medication Changes/New allergies or changes in medical history, any new surgeries or procedures? NO    If yes, update Summary List   Subjective Functional Status/Changes:  []  No changes reported     Pt reports she is not using anything to walk with in the house.  SPC for community distances         OBJECTIVE  Modalities Rationale:     decrease edema, decrease inflammation, decrease pain and increase tissue extensibility to improve patient's ability to perform stairs,    min [] Estim, type/location:                                      []  att     []  unatt     []  w/US     []  w/ice    []  w/heat    min []  Mechanical Traction: type/lbs                   []  pro   []  sup   []  int   []  cont    []  before manual    []  after manual    min []  Ultrasound, settings/location:      min []  Iontophoresis w/ dexamethasone, location:                                               []  take home patch       []  in clinic    min []  Ice     []  Heat    location/position:    10 min [x]  Vasopneumatic Device, press/temp: Low 34 degrees    min []  Other:    [x] Skin assessment post-treatment (if applicable):    [x]  intact    []  redness- no adverse reaction     []redness  adverse reaction:        47 min Therapeutic Exercise:  [x]  See flow sheet   Rationale:      increase ROM, increase strength, improve balance and increase proprioception to improve the patients ability to perform community walking     8 min Manual Therapy: Supine patella glides gr 1-2; PROM supine knee flexion   Rationale:      decrease pain, increase ROM and increase tissue extensibility to improve patient's ability to increase tissue mobility for full ROM  8 min Gait training : gait training in clinic without AD with mirror for visual feedback and VCs for heel to toe , equal stride length, reciprocal UE arm swing, minimize trendelenburg gait; gait training on stairs for reciprocal gait ascending stairs and single step gait descending stairs to improve community ambulation. min Patient Education:  YES  Reviewed HEP   []  Progressed/Changed HEP based on: Other Objective/Functional Measures:  AROM:   Supine SLR with slight extension lag  Add prone hip extension     Post Treatment Pain Level (on 0 to 10) scale:  0  / 10     ASSESSMENT  Assessment/Changes in Function:     See progress note     [x]  See Progress Note/Recertification   Patient will continue to benefit from skilled PT services to modify and progress therapeutic interventions, address functional mobility deficits, address ROM deficits, address strength deficits, analyze and address soft tissue restrictions and instruct in home and community integration to attain remaining goals.    Progress toward goals / Updated goals:  See progress note     PLAN  []  Upgrade activities as tolerated YES Continue plan of care   []  Discharge due to :    []  Other:      Therapist: Eagle Gaona PTA    Date: 7/11/2018 Time: 12:43 PM     Future Appointments  Date Time Provider Xiomara Moeller   7/13/2018 2:30 PM Shiela Lara, [facility]   7/16/2018 11:30 AM Eagle Gaona PTA [facility]   7/18/2018 11:30 AM Eagle Gaona PTA [facility]   7/20/2018 11:30 AM Eagle Gaona PTA [facility]

## 2018-07-11 NOTE — PROGRESS NOTES
[facility] [address] - Phone: [phone]  Fax: [phone]  PROGRESS NOTE  Patient Name: Catracho Thapa : 1960   Treatment/Medical Diagnosis: Left knee pain [M25.562]   Referral Source: Roxana Kline MD     Date of Initial Visit: 6-15-18 Attended Visits: 11 Missed Visits: 0     SUMMARY OF TREATMENT  Physical therapy treatment has consisted of thereapeutic exercise for ROM and strengthening L LE per protocol, Manual therapy, gait training, HEP, and ice with compression. CURRENT STATUS  Patient has progressed well in Physical Therapy, consistently reporting improving ROM, decreasing pain, and increased functional ability. Functional Deficits: walking, stairs, household chores. Pt's current pain range is 1 to 2/10. Functional improvements are bed mobility, standing , walking , stairs, household chores . Pt would benefit from continued PT intervention in order to improve knee  AROM/PROM, strength, flexibility, Functional mobility, and address remaining impairments. Goal/Measure of Progress Goal Met? 1. Patient performing daily home exercise program..   Status at last Eval: dependent Current Status: Pt instructed in initial HEP. yes   2. Increase FOTO to 45 indicating improved function and quality of life. Status at last Eval: 30 Current Status: 53 yes   3. Increase AROM in Lknee 0-90 degrees to facilitate walking. Status at last Eval: 0 to 40 degrees Current Status: 0 to 97 degrees  PROM: flexion: 100 degrees yes   4.  4) Patient able to perform normal L SLR withquad set to 45 degrees to facilitate independent bed mobility. 5) Patient ambulating household distances with a straight cane   Status at last Eval: Ambulation with RW Current Status: Pt able to perform supine SLR with slight extension lag x 10 reps  Pt ambulating with SPC in community. No AD in home.  yes     New Goals to be achieved in _4_  weeks:  1) Patient  independent with HEP. 2) Patient will improve L knee AROM to 0-120 degrees to facilitate walking. 3) Increase FOTO to 62 indicating improved function and quality of life. 4) Patient will increase L knee strength and bilateral hip abd/flexion to 4/5 so patient able to negotiate 1 flight of stairs with rail and SC step over step. 5) Patient ambulating community distances without assistive device. RECOMMENDATIONS  Plan to continue 2x per week x 4 weeks  If you have any questions/comments please contact us directly at  [phone]. Thank you for allowing us to assist in the care of your patient. LPTA Signature: Susy Gomez PTA  Date: 7/11/2018   PT Signature: Matthias Mulligan PT Time: 12:08 PM   NOTE TO PHYSICIAN:  PLEASE COMPLETE THE ORDERS BELOW AND FAX TO   [facility] Physical Therapy: [phone]. If you are unable to process this request in 24 hours please contact our office:  [phone].    ___ I have read the above report and request that my patient continue as recommended.   ___ I have read the above report and request that my patient continue therapy with the following changes/special instructions:_________________________________________________________   ___ I have read the above report and request that my patient be discharged from therapy.      Physician Signature:        Date:       Time:

## 2018-07-13 ENCOUNTER — HOSPITAL ENCOUNTER (OUTPATIENT)
Dept: PHYSICAL THERAPY | Age: 58
Discharge: HOME | End: 2018-07-13
Payer: COMMERCIAL

## 2018-07-13 PROCEDURE — 97110 THERAPEUTIC EXERCISES: CPT

## 2018-07-13 PROCEDURE — 97140 MANUAL THERAPY 1/> REGIONS: CPT

## 2018-07-13 PROCEDURE — 97016 VASOPNEUMATIC DEVICE THERAPY: CPT

## 2018-07-13 NOTE — PROGRESS NOTES
PHYSICAL THERAPY - DAILY TREATMENT NOTE    Patient Name: Pranay Goodman        Date: 2018  : 1960   YES Patient  Verified  Visit #:     Insurance: Payor: Liborio Ahmadi / Plan: [place] [employer] [place] / Product Type: PPO /      In time: 1:48 Out time: 3:09   Total Treatment Time: 81     Medicare Time Tracking (below)   Total Timed Codes (min):  na 1:1 Treatment Time:  na     TREATMENT AREA =  Left knee pain [M25.562]    SUBJECTIVE  Pain Level (on 0 to 10 scale):  0  / 10   Medication Changes/New allergies or changes in medical history, any new surgeries or procedures? NO    If yes, update Summary List   Subjective Functional Status/Changes:  []  No changes reported     Feeling tight in the back of the knee.           OBJECTIVE  Modalities Rationale:  decrease edema, decrease inflammation, decrease pain and increase tissue extensibility to improve patient's ability to perform stairs,      min [] Estim, type/location:                                      []  att     []  unatt     []  w/US     []  w/ice    []  w/heat    min []  Mechanical Traction: type/lbs                   []  pro   []  sup   []  int   []  cont    []  before manual    []  after manual    min []  Ultrasound, settings/location:      min []  Iontophoresis w/ dexamethasone, location:                                               []  take home patch       []  in clinic    min []  Ice     []  Heat    location/position:    10 min [x]  Vasopneumatic Device, press/temp: Low 34 degrees    min []  Other:    [x] Skin assessment post-treatment (if applicable):    [x]  intact    []  redness- no adverse reaction     []redness  adverse reaction:        61  (bill 51) min Therapeutic Exercise:  [x]  See flow sheet   Rationale:   increase ROM, increase strength, improve balance and increase proprioception to improve the patients ability to perform community walking       10 min Manual Therapy: PROM supine knee flexion, superior scar mobs and retrograde massage with patient education inb self massage. Rationale:      decrease pain, increase ROM and increase tissue extensibility to improve patient's ability to increase tissue mobility for full ROM         min Patient Education:  YES  Reviewed HEP   [x]  Progressed/Changed HEP based on:  Practice gait sidestep and backward 1x day. Other Objective/Functional Measures:    AROM  0-100   PROM 0-102  Progressed to standing hip 3 way bilateral.  Bilateral hip abd 3-/5. Post Treatment Pain Level (on 0 to 10) scale:   0  / 10     ASSESSMENT  Assessment/Changes in Function:     Decreased trendelenburg in gait without AD. Good L knee control with ascending and descending 3 steps x2 and with 6\" step ups. []  See Progress Note/Recertification   Patient will continue to benefit from skilled PT services to modify and progress therapeutic interventions, address functional mobility deficits, address ROM deficits, address strength deficits, analyze and address soft tissue restrictions and instruct in home and community integration to attain remaining goals. Progress toward goals / Updated goals:  ) Patient  independent with HEP. Progressing  2) Patient will improve L knee AROM to 0-120 degrees to facilitate walking. Progressing 0-100  3) Increase FOTO to 62 indicating improved function and quality of life. 4) Patient will increase L knee strength and bilateral hip abd/flexion to 4/5 so patient able to negotiate 1 flight of stairs with rail and SC step over step. Progressing  5) Patient ambulating community distances without assistive device.         PLAN  [x]  Upgrade activities as tolerated YES Continue plan of care   []  Discharge due to :    []  Other:      Therapist: Elisha Ramirez PT    Date: 7/13/2018 Time: 1:28 PM     Future Appointments  Date Time Provider Xiomara Moeller   7/13/2018 2:30 PM Elisha Ramirez, [facility]   7/16/2018 11:30 AM Curly Duque PTA [facility]   7/18/2018 11:30 AM [address] [facility]   7/20/2018 11:30 AM Blanca Jamison PTA [facility]

## 2018-07-16 ENCOUNTER — HOSPITAL ENCOUNTER (OUTPATIENT)
Dept: PHYSICAL THERAPY | Age: 58
Discharge: HOME | End: 2018-07-16
Payer: COMMERCIAL

## 2018-07-16 PROCEDURE — 97110 THERAPEUTIC EXERCISES: CPT

## 2018-07-16 PROCEDURE — 97016 VASOPNEUMATIC DEVICE THERAPY: CPT

## 2018-07-16 NOTE — PROGRESS NOTES
PHYSICAL THERAPY - DAILY TREATMENT NOTE    Patient Name: Elder Hubbard        Date: 2018  : 1960   YES Patient  Verified  Visit #:  15  of   18  Insurance: Payor: Liliya Comment / Plan: [place] [employer] [place] / Product Type: PPO /      In time: 11:34 am Out time: 12:10 pm   Total Treatment Time: 36     Medicare Time Tracking (below)   Total Timed Codes (min):  n/a 1:1 Treatment Time:  n/a     TREATMENT AREA =  Left knee pain [M25.562]    SUBJECTIVE  Pain Level (on 0 to 10 scale):  1  / 10   Medication Changes/New allergies or changes in medical history, any new surgeries or procedures? NO    If yes, update Summary List   Subjective Functional Status/Changes:  []  No changes reported     Pt reports her Baptist had a [employer] at the beach yesterday. \"I walked real slow and took my time and I did well. \" pt reports no pain.     Pt reports pain/soreness in the side and back of leg          OBJECTIVE  Modalities Rationale:     decrease edema, decrease inflammation, decrease pain and increase tissue extensibility to improve patient's ability to perform prolonged standing and walking   min [] Estim, type/location:                                      []  att     []  unatt     []  w/US     []  w/ice    []  w/heat    min []  Mechanical Traction: type/lbs                   []  pro   []  sup   []  int   []  cont    []  before manual    []  after manual    min []  Ultrasound, settings/location:      min []  Iontophoresis w/ dexamethasone, location:                                               []  take home patch       []  in clinic    min []  Ice     []  Heat    location/position:    10 min [x]  Vasopneumatic Device, press/temp: Light 36 degrees    min []  Other:    [x] Skin assessment post-treatment (if applicable):    [x]  intact    []  redness- no adverse reaction     []redness  adverse reaction:        22 min Therapeutic Exercise:  [x]  See flow sheet   Rationale:      increase ROM and increase strength to improve the patients ability to perform functional ADLs     4 min Manual Therapy: Supine gentle PROM into knee flexion   Rationale:      decrease pain, increase ROM and increase tissue extensibility to improve patient's ability to improve tissue mobility toward full AROM       min Patient Education:  YES  Reviewed HEP   []  Progressed/Changed HEP based on: Other Objective/Functional Measures:  Review stair management  Emphasized self stretching into knee flexion for HEP. Post Treatment Pain Level (on 0 to 10) scale:   0  / 10     ASSESSMENT  Assessment/Changes in Function:   Pt would benefit from further quad strengthening secondary to slight extension lag present in supine SLR. Changed side lying hip abduction to supine hip abduction secondary to increased soreness posterior/lateral knee over the weekend. []  See Progress Note/Recertification   Patient will continue to benefit from skilled PT services to modify and progress therapeutic interventions, address functional mobility deficits, address ROM deficits, address strength deficits, analyze and address soft tissue restrictions, analyze and cue movement patterns and instruct in home and community integration to attain remaining goals.    Progress toward goals / Updated goals:  Continue toward all current updated goals     PLAN  []  Upgrade activities as tolerated YES Continue plan of care   []  Discharge due to :    []  Other:      Therapist: Maxwell Durbin PTA    Date: 7/16/2018 Time: 12:10 pm     Future Appointments  Date Time Provider Xiomara Moeller   7/18/2018 11:30 AM Maxwell Durbin PTA [facility]   7/20/2018 11:30 AM Maxwell Durbin PTA [facility]

## 2018-07-18 ENCOUNTER — HOSPITAL ENCOUNTER (OUTPATIENT)
Dept: PHYSICAL THERAPY | Age: 58
Discharge: HOME | End: 2018-07-18
Payer: COMMERCIAL

## 2018-07-18 PROCEDURE — 97140 MANUAL THERAPY 1/> REGIONS: CPT

## 2018-07-18 PROCEDURE — 97110 THERAPEUTIC EXERCISES: CPT

## 2018-07-18 PROCEDURE — 97016 VASOPNEUMATIC DEVICE THERAPY: CPT

## 2018-07-18 NOTE — PROGRESS NOTES
PHYSICAL THERAPY - DAILY TREATMENT NOTE    Patient Name: Rober Gunn        Date: 2018  : 1960   YES Patient  Verified  Visit #:   15   of   18  Insurance: Payor: Dayton Quiroz / Plan: [place] [employer] [place] / Product Type: PPO /      In time: 11:33 am Out time: 12:35 pm   Total Treatment Time: 62     Medicare Time Tracking (below)   Total Timed Codes (min):  n/a 1:1 Treatment Time:  n/a     TREATMENT AREA =  Left knee pain [M25.562]    SUBJECTIVE  Pain Level (on 0 to 10 scale): 2  / 10   Medication Changes/New allergies or changes in medical history, any new surgeries or procedures? NO    If yes, update Summary List   Subjective Functional Status/Changes:  []  No changes reported     If I could just get some sleep at night. I toss and turn all night. Pt reports left hamstring has been more sore this week. Throbbing in knee yesterday evening (intermittent) and this morning. Not throbbing today.          OBJECTIVE  Modalities Rationale:     decrease edema, decrease inflammation, decrease pain and increase tissue extensibility to improve patient's ability to perform prolonged standing and walking   min [] Estim, type/location:                                      []  att     []  unatt     []  w/US     []  w/ice    []  w/heat    min []  Mechanical Traction: type/lbs                   []  pro   []  sup   []  int   []  cont    []  before manual    []  after manual    min []  Ultrasound, settings/location:      min []  Iontophoresis w/ dexamethasone, location:                                               []  take home patch       []  in clinic    min []  Ice     []  Heat    location/position:    10 min [x]  Vasopneumatic Device, press/temp: Low 34 degrees    min []  Other:    [x] Skin assessment post-treatment (if applicable):    [x]  intact    []  redness- no adverse reaction     []redness  adverse reaction:        44 min Therapeutic Exercise:  [x]  See flow sheet   Rationale:      increase ROM and increase strength to improve the patients ability to perform prolonged standing and walking     8 min Manual Therapy: Scar massage to proximal distal incision and around incision, review self scar massage techniques, patella glides   Rationale:      decrease pain, increase ROM, increase tissue extensibility and decrease edema  to improve patient's ability to perform prolonged standing and walking     min Patient Education:  YES  Reviewed HEP   []  Progressed/Changed HEP based on: Other Objective/Functional Measures:  AROM: 0 to 102 degrees  PROM: 106 degrees  Add treadmill walking x 4 min     Post Treatment Pain Level (on 0 to 10) scale:   0  / 10     ASSESSMENT  Assessment/Changes in Function:   Pt demonstrating improving lift in prone hip extension. Pt would benefit from continued LE strengthening as indicated by slight extension lag in supine SLR. Slow progress toward full AROM flexion. Emphasized self stretching in HEP. Pt had one episode of mild left knee buckling while treadmill walking with independent recovery and use of UE assist. Progress treadmill walking as tolerated. []  See Progress Note/Recertification   Patient will continue to benefit from skilled PT services to modify and progress therapeutic interventions, address functional mobility deficits, address ROM deficits, address strength deficits, analyze and address soft tissue restrictions, analyze and cue movement patterns and instruct in home and community integration to attain remaining goals. Progress toward goals / Updated goals:  1) Patient  independent with HEP. Progressing  2) Patient will improve L knee AROM to 0-120 degrees to facilitate walking. Progressing 0-100  3) Increase FOTO to 62 indicating improved function and quality of life. 4) Patient will increase L knee strength and bilateral hip abd/flexion to 4/5 so patient able to negotiate 1 flight of stairs with rail and SC step over step.  Progressing  5) Patient ambulating community distances without assistive device     PLAN  []  Upgrade activities as tolerated YES Continue plan of care   []  Discharge due to :    []  Other:      Therapist: Perla Garcia PTA    Date: 7/18/2018 Time: 12:35 pm     Future Appointments  Date Time Provider Xiomara Moeller   7/20/2018 11:30 AM Perla Garcia PTA [facility]

## 2018-07-20 ENCOUNTER — HOSPITAL ENCOUNTER (OUTPATIENT)
Dept: PHYSICAL THERAPY | Age: 58
End: 2018-07-20
Payer: COMMERCIAL

## 2018-07-23 ENCOUNTER — HOSPITAL ENCOUNTER (OUTPATIENT)
Dept: PHYSICAL THERAPY | Age: 58
Discharge: HOME | End: 2018-07-23
Payer: COMMERCIAL

## 2018-07-23 PROCEDURE — 97110 THERAPEUTIC EXERCISES: CPT | Performed by: PHYSICAL THERAPIST

## 2018-07-23 PROCEDURE — 97112 NEUROMUSCULAR REEDUCATION: CPT | Performed by: PHYSICAL THERAPIST

## 2018-07-23 PROCEDURE — 97140 MANUAL THERAPY 1/> REGIONS: CPT | Performed by: PHYSICAL THERAPIST

## 2018-07-23 PROCEDURE — 97116 GAIT TRAINING THERAPY: CPT | Performed by: PHYSICAL THERAPIST

## 2018-07-23 NOTE — PROGRESS NOTES
PHYSICAL THERAPY - DAILY TREATMENT NOTE    Patient Name: Marco Vega        Date: 2018  : 1960   YES Patient  Verified  Visit #:     Insurance: Payor: Sasha Thrasher / Plan: José Miguel Koo PPO / Product Type: PPO /      In time: 2:30 Out time: 3:50   Total Treatment Time: 80     TREATMENT AREA =  Left knee pain [M25.562]    SUBJECTIVE  Pain Level (on 0 to 10 scale):  0  / 10   Medication Changes/New allergies or changes in medical history, any new surgeries or procedures? NO    If yes, update Summary List   Subjective Functional Status/Changes:  []  No changes reported     Patient notes 85% improvement since starting PT. States she would be at 100% if she had no pain and she could more fully bend her knees. 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 OBJECTIVE  Modalities Rationale:     decrease edema and decrease inflammation to improve patient's ability to walk with normal gait pattern.    min [] Estim, type/location:                                      []  att     []  unatt     []  w/US     []  w/ice    []  w/heat    min []  Mechanical Traction: type/lbs                   []  pro   []  sup   []  int   []  cont    []  before manual    []  after manual    min []  Ultrasound, settings/location:      min []  Iontophoresis w/ dexamethasone, location:                                               []  take home patch       []  in clinic    min []  Ice     []  Heat    location/position:    10 min [x]  Vasopneumatic Device, press/temp: Med, 34 deg- supine left knee    min []  Other:    [] Skin assessment post-treatment (if applicable):    [x]  intact    []  redness- no adverse reaction     []redness  adverse reaction:        18 min Therapeutic Exercise:  [x]  See flow sheet   Rationale:      increase ROM and increase strength to improve the patients ability to bend knee for stairs     12 min Manual Therapy: Supine scar release, inferior patellar mobs in supine and sitting to increase knee flexion   Rationale:      decrease pain, increase ROM and increase tissue extensibility to improve patient's ability to walk with normal gait     28 min Neuromuscular Re-ed:    Rationale:    increase strength, improve coordination, improve balance and increase proprioception to improve the patients ability to walk without AD    12 min Gait Training:  WS to left and right, ant/post, and \"hula hoops\" on feet to increase WB on left; worked on longer right step with increased WB on left with increased trunk ROT   Rationale:         min Patient Education:  YES  Reviewed HEP   []  Progressed/Changed HEP based on: Other Objective/Functional Measures:  Left knee AAROM in supine and sittin-110 versus right 0-120  Educated to stand on one foot while brushing teeth and to be aware of side to side sway - if she notes this, increase step lengths    Left hip abduction:  3- to 3/5 strength with noted increased pelvic control in standing     Post Treatment Pain Level (on 0 to 10) scale:   0  / 10     ASSESSMENT  Assessment/Changes in Function:   Great progress today with AAROM 0-110 degrees in supine and sitting. Noted improved WB on left with increased forward progression and less side to side sway. DC'd SPC so patient is now walking without AD in home and community. []  See Progress Note/Recertification   Patient will continue to benefit from skilled PT services to modify and progress therapeutic interventions, address functional mobility deficits, address ROM deficits, address strength deficits, analyze and address soft tissue restrictions, analyze and cue movement patterns, assess and modify postural abnormalities and instruct in home and community integration to attain remaining goals.    Progress toward goals / Updated goals:  Progress toward goals / Updated goals:  1) Patient  independent with HEP.  Progressing  2) Patient will improve L knee AROM to 0-120 degrees to facilitate walking. Progressing 0-110 (sitting and supine)  3) Increase FOTO to 62 indicating improved function and quality of life. 4) Patient will increase L knee strength and bilateral hip abd/flexion to 4/5 so patient able to negotiate 1 flight of stairs with rail and SC step over step. Progressing - left hip abduction 3- to 3/5  5) Patient ambulating community distances without assistive device - progressing - DC'd SPC on 7/23 - need to assure safe without SPC         PLAN  [x]  Upgrade activities as tolerated YES Continue plan of care   []  Discharge due to :    []  Other: Discussed possible DC after 3 visits but may need a few more due to weak hip abductors.        Therapist: Rafal López PT    Date: 7/23/2018 Time: 2:35 PM     Future Appointments  Date Time Provider Xiomara Moeller   7/27/2018 11:30 AM Jerry Bauer PTA [facility]   7/30/2018 3:30 PM Grey Soler PT [facility]   8/3/2018 11:30 AM Jerry Bauer PTA [facility]

## 2018-07-27 ENCOUNTER — HOSPITAL ENCOUNTER (OUTPATIENT)
Dept: PHYSICAL THERAPY | Age: 58
Discharge: HOME | End: 2018-07-27
Payer: COMMERCIAL

## 2018-07-27 PROCEDURE — 97140 MANUAL THERAPY 1/> REGIONS: CPT

## 2018-07-27 PROCEDURE — 97110 THERAPEUTIC EXERCISES: CPT

## 2018-07-27 PROCEDURE — 97016 VASOPNEUMATIC DEVICE THERAPY: CPT

## 2018-07-27 NOTE — PROGRESS NOTES
PHYSICAL THERAPY - DAILY TREATMENT NOTE Patient Name: Francisco Simon        Date: 2018 : 1960   YES Patient  Verified Visit #:     Insurance: Payor: Jakob Medico / Plan: [place] [employer] PPO / Product Type: PPO / In time: 11:23 am Out time: 12:33 pm  
Total Treatment Time: 79 Medicare Time Tracking (below) Total Timed Codes (min):  n/a 1:1 Treatment Time:  n/a  
 
TREATMENT AREA =  Left knee pain [M25.562] SUBJECTIVE Pain Level (on 0 to 10 scale):  1  / 10 Medication Changes/New allergies or changes in medical history, any new surgeries or procedures? NO    If yes, update Summary List  
Subjective Functional Status/Changes:  []  No changes reported I feel like I overcame something Monday. It felt really good after she worked on it Monday. I'm not using my cane Pt reports walking on Treadmill this week on Tuesday x 30 min OBJECTIVE Modalities Rationale:     decrease edema, decrease inflammation, decrease pain and increase tissue extensibility to improve patient's ability to perform prolonged walking, standing 
 min [] Estim, type/location:    
                                 []  att     []  unatt     []  w/US     []  w/ice    []  w/heat  
 min []  Mechanical Traction: type/lbs   
               []  pro   []  sup   []  int   []  cont    []  before manual    []  after manual  
 min []  Ultrasound, settings/location:    
 min []  Iontophoresis w/ dexamethasone, location:   
                                           []  take home patch       []  in clinic  
 min []  Ice     []  Heat    location/position:   
10 min [x]  Vasopneumatic Device, press/temp: Low 34 degrees  
 min []  Other:   
[x] Skin assessment post-treatment (if applicable):   
[x]  intact    []  redness- no adverse reaction    
[]redness  adverse reaction:     
 
48 min Therapeutic Exercise:  [x]  See flow sheet Rationale:      increase ROM, increase strength, improve coordination, improve balance and increase proprioception to improve the patients ability to perform prolonged walking, standing 12 min Manual Therapy: Sit inferior glides with flexion PROM, supine PROM left knee flexion, scar massage, review self scar tissue massage techniques Rationale:      decrease pain, increase ROM, increase tissue extensibility and decrease edema  to improve patient's ability to improve tissue mobility 
 
 
 min Patient Education:  YES  Reviewed HEP []  Progressed/Changed HEP based on: Other Objective/Functional Measures: 
Self AAROM: 0 to 110 degrees PROM: flexion: 112 degrees Post Treatment Pain Level (on 0 to 10) scale:  0  / 10 ASSESSMENT Assessment/Changes in Function:  
Pt demonstrating improving prone hip extension AROM improving during session from AAROM-AROM. Patient demonstrating decreasing antaligic gait during ambulation without ADL. pt met long term goal for ambulation without AD at home and in community. []  See Progress Note/Recertification Patient will continue to benefit from skilled PT services to modify and progress therapeutic interventions, address functional mobility deficits, address ROM deficits, address strength deficits, analyze and address soft tissue restrictions and instruct in home and community integration to attain remaining goals. Progress toward goals / Updated goals: 
1) Patient  independent with HEP.  goal in progress 2) Patient will improve L knee AROM to 0-120 degrees to facilitate walking. Progressing 0-110 (sitting and supine) 3) Increase FOTO to 62 indicating improved function and quality of life. 4) Patient will increase L knee strength and bilateral hip abd/flexion to 4/5 so patient able to negotiate 1 flight of stairs with rail and SC step over step. Progressing 5) Patient ambulating community distances without assistive device - progressing - DC'd SPC on 7/23 - goal met 7-27-18 PLAN 
[]  Upgrade activities as tolerated YES Continue plan of care  
[]  Discharge due to :   
[]  Other:   
 
Therapist: Keyshawn Mancia PTA Date: 7/27/2018 Time: 12:33 pm  
 
Future Appointments Date Time Provider Xiomara Moeller 7/30/2018 3:30 PM Jessica Hand PT [facility]  
8/3/2018 11:30 AM Keyshawn Mancia PTA [facility]

## 2018-07-30 ENCOUNTER — HOSPITAL ENCOUNTER (OUTPATIENT)
Dept: PHYSICAL THERAPY | Age: 58
Discharge: HOME | End: 2018-07-30
Payer: COMMERCIAL

## 2018-07-30 PROCEDURE — 97140 MANUAL THERAPY 1/> REGIONS: CPT

## 2018-07-30 PROCEDURE — 97110 THERAPEUTIC EXERCISES: CPT

## 2018-07-30 NOTE — PROGRESS NOTES
PHYSICAL THERAPY - DAILY TREATMENT NOTE Patient Name: Jocelyn Catherine        Date: 2018 : 1960   YES Patient  Verified Visit #:     Insurance: Payor: OPTIMA / Plan: [place] [employer] [place] / Product Type: PPO / In time: 3:35 Out time: 4:54 Total Treatment Time: 71 Medicare Time Tracking (below) Total Timed Codes (min):  na 1:1 Treatment Time:  na  
 
TREATMENT AREA =  Left knee pain [M25.562] SUBJECTIVE Pain Level (on 0 to 10 scale):  1  / 10 Medication Changes/New allergies or changes in medical history, any new surgeries or procedures? NO    If yes, update Summary List  
Subjective Functional Status/Changes:  []  No changes reported Patient reports that back of leg hurts. She has tried doing hamstring stretching which doesn't help. Katia Pavon OBJECTIVE Modalities Rationale: decrease edema, decrease inflammation, decrease pain and increase tissue extensibility to improve patient's ability to perform prolonged walking, standing     
 min [] Estim, type/location:    
                                 []  att     []  unatt     []  w/US     []  w/ice    []  w/heat  
 min []  Mechanical Traction: type/lbs   
               []  pro   []  sup   []  int   []  cont    []  before manual    []  after manual  
 min []  Ultrasound, settings/location:    
 min []  Iontophoresis w/ dexamethasone, location:   
                                           []  take home patch       []  in clinic  
10 min [x]  Ice     []  Heat    location/position: L knee and hamstrings in supine  
 min []  Vasopneumatic Device, press/temp:   
 min []  Other:   
[x] Skin assessment post-treatment (if applicable):   
[x]  intact    []  redness- no adverse reaction    
[]redness  adverse reaction:     
 
49 min Therapeutic Exercise:  [x]  See flow sheet Rationale:      increase ROM, increase strength, improve coordination, improve balance and increase proprioception to improve the patients ability to perform prolonged walking, standing  
  
10 min Manual Therapy: Supine inferior glides, supine PROM left knee flexion, scar massage, review self scar tissue massage techniques, rollator to hamstrings Rationale:      decrease pain, increase ROM, increase tissue extensibility and decrease edema  to improve patient's ability to improve tissue mobility 
  
   
 
 min Patient Education:  YES  Reviewed HEP []  Progressed/Changed HEP based on:  Sit to stand with = weight bearing. Other Objective/Functional Measures: AROM   0-102 PROM 0-110 Post Treatment Pain Level (on 0 to 10) scale:   0  / 10 ASSESSMENT Assessment/Changes in Function:  
 
Slowly improving PROM. Tenderness over hamstring mm. 
  
[]  See Progress Note/Recertification Patient will continue to benefit from skilled PT services to modify and progress therapeutic interventions, address functional mobility deficits, address ROM deficits, address strength deficits, analyze and address soft tissue restrictions and instruct in home and community integration to attain remaining goals. Progress toward goals / Updated goals: 
1) Patient  independent with HEP.  goal in progress 2) Patient will improve L knee AROM to 0-120 degrees to facilitate walking. Progressing 0-110 (sitting and supine) 3) Increase FOTO to 62 indicating improved function and quality of life. 4) Patient will increase L knee strength and bilateral hip abd/flexion to 4/5 so patient able to negotiate 1 flight of stairs with rail and SC step over step. Progressing 5) Patient ambulating community distances without assistive device - progressing - DC'd SPC on 7/23 - goal met 7-27-18 PLAN [x]  Upgrade activities as tolerated YES Continue plan of care  
[]  Discharge due to :   
[]  Other:   
 
Therapist: Min Lazcano PT Date: 7/30/2018 Time: 3:49 PM  
 
Future Appointments Date Time Provider Xiomara Moeller 8/3/2018 11:30 AM Shalonda Serrano PTA [facility]

## 2018-08-03 ENCOUNTER — HOSPITAL ENCOUNTER (OUTPATIENT)
Dept: PHYSICAL THERAPY | Age: 58
Discharge: HOME | End: 2018-08-03
Payer: COMMERCIAL

## 2018-08-03 PROCEDURE — 97110 THERAPEUTIC EXERCISES: CPT

## 2018-08-03 PROCEDURE — 97016 VASOPNEUMATIC DEVICE THERAPY: CPT

## 2018-08-03 NOTE — PROGRESS NOTES
[facility] PHYSICAL THERAPY AT [address], [address], [place] [address] - Phone: [phone]  Fax: [phone] SUMMARY  Patient Name: Sancho Valles : 1960   Treatment/Medical Diagnosis: Left knee pain [M25.562]   Referral Source: Loni Mary MD     Date of Initial Visit: 6-15-18 Attended Visits: 18 Missed Visits: 0     SUMMARY OF TREATMENT  Physical therapy treatment has consisted of thereapeutic exercise for ROM and strengthening L LE per protocol, Manual therapy, gait training, HEP, and ice with compression. CURRENT STATUS  Patient has progressed well in Physical Therapy, consistently reporting improving ROM, decreasing pain, and increased functional ability. . Pt's current pain range is 0 to 1/10. Functional improvements are bed mobility, standing , walking , stairs, and  household chores . Patient ambulating without assistive device in community. Pt is independent in discharge HEP. Goal/Measure of Progress Goal Met? 1. Patient  independent with HEP. Status at last Eval: I in initial HEP Current Status: Pt I in discharge HEP. yes   2. Patient will improve L knee AROM to 0-120 degrees to facilitate walking. Status at last Eval: 0 to 97 degrees  PROM: flexion: 100 degrees Current Status: 0 to 110 degrees Goal in progress   3. Increase FOTO to 62 indicating improved function and quality of life. Status at last Eval: 30 Current Status: 89 yes   4.  4) Patient will increase L knee strength and bilateral hip abd/flexion to 4/5 so patient able to negotiate 1 flight of stairs with rail and SC step over step. 5) Patient ambulating community distances without assistive device   Status at last Eval: Ambulation on stairs with rails and single step. SPC ambulation in community. Current Status: MMT: flex: 4+/5, ext: 4/5  Pt able to negotiate 1 flight of stairs   Ambulation without AD in community.  yes RECOMMENDATIONS  Discontinue therapy. Progressing towards or have reached established goals. If you have any questions/comments please contact us directly at [phone]. Thank you for allowing us to assist in the care of your patient. LPTA Signature: Aye Abreu PTA Date: 8-3-18   Therapist Signature:  Joel Borges PT Time: 9:23 AM

## 2018-08-03 NOTE — PROGRESS NOTES
PHYSICAL THERAPY - DAILY TREATMENT NOTE    Patient Name: Catracho Thapa        Date: 8/3/2018  : 1960   YES Patient  Verified  Visit #:     Insurance: Payor: Jackie Toledo / Plan: Aruna Ruth PPO / Product Type: PPO /      In time: 11:28 am Out time: 12:32 pm   Total Treatment Time: 64     Medicare Time Tracking (below)   Total Timed Codes (min): n/a 1:1 Treatment Time:  n/a     TREATMENT AREA =  Left knee pain [M25.562]    SUBJECTIVE  Pain Level (on 0 to 10 scale): 1  / 10   Medication Changes/New allergies or changes in medical history, any new surgeries or procedures? NO    If yes, update Summary List   Subjective Functional Status/Changes:  []  No changes reported     I feel Ok . Some days it just feels stiff.           OBJECTIVE  Modalities Rationale:     decrease edema, decrease inflammation, decrease pain and increase tissue extensibility to improve patient's ability to perform prolonged walking and standing   min [] Estim, type/location:                                      []  att     []  unatt     []  w/US     []  w/ice    []  w/heat    min []  Mechanical Traction: type/lbs                   []  pro   []  sup   []  int   []  cont    []  before manual    []  after manual    min []  Ultrasound, settings/location:      min []  Iontophoresis w/ dexamethasone, location:                                               []  take home patch       []  in clinic    min []  Ice     []  Heat    location/position:    10 min [x]  Vasopneumatic Device, press/temp: Low 34 degrees    min []  Other:    [x] Skin assessment post-treatment (if applicable):    [x]  intact    []  redness- no adverse reaction     []redness - adverse reaction:        54 min Therapeutic Exercise:  [x]  See flow sheet   Rationale:      increase ROM, increase strength, improve balance and increase proprioception to improve the patients ability to  prolonged walking and standing, stairs        min Patient Education:  YES  Reviewed HEP   [] Progressed/Changed HEP based on: Other Objective/Functional Measures:    AROM: 0 to 110 degrees  SLS: 27 seconds  FOTO: 89     Post Treatment Pain Level (on 0 to 10) scale:  0  / 10     ASSESSMENT  Assessment/Changes in Function:   Review discharge instructions to continue HEP 3x per week x 4-6 weeks as tolerated. [x]  See Progress Note/Recertification   Patient will continue to benefit from skilled PT services to modify and progress therapeutic interventions, address functional mobility deficits, address ROM deficits, address strength deficits, analyze and address soft tissue restrictions and instruct in home and community integration to attain remaining goals.    Progress toward goals / Updated goals:    See discharge     PLAN  []  Upgrade activities as tolerated NO Continue plan of care   [x]  Discharge due to : Met goals   []  Other:      Therapist: Virgie Singh PTA    Date: 8/3/2018 Time: 12:32 pm     Future Appointments  Date Time Provider Xiomara Moeller   8/3/2018 11:30 AM Virgie Singh PTA [facility]

## 2024-06-18 ENCOUNTER — HOSPITAL ENCOUNTER (OUTPATIENT)
Facility: HOSPITAL | Age: 64
Setting detail: SPECIMEN
Discharge: HOME | End: 2024-06-21

## 2024-06-18 PROCEDURE — 99001 SPECIMEN HANDLING PT-LAB: CPT

## 2024-07-23 ENCOUNTER — HOSPITAL ENCOUNTER (OUTPATIENT)
Facility: HOSPITAL | Age: 64
Setting detail: SPECIMEN
Discharge: HOME | End: 2024-07-26

## 2024-07-23 LAB — SENTARA SPECIMEN COLLECTION: NORMAL

## 2024-07-23 PROCEDURE — 99001 SPECIMEN HANDLING PT-LAB: CPT
